# Patient Record
Sex: FEMALE | Employment: FULL TIME | URBAN - METROPOLITAN AREA
[De-identification: names, ages, dates, MRNs, and addresses within clinical notes are randomized per-mention and may not be internally consistent; named-entity substitution may affect disease eponyms.]

---

## 2018-02-07 ENCOUNTER — OFFICE VISIT (OUTPATIENT)
Dept: INTERNAL MEDICINE | Facility: CLINIC | Age: 33
End: 2018-02-07
Payer: MEDICAID

## 2018-02-07 VITALS
DIASTOLIC BLOOD PRESSURE: 60 MMHG | OXYGEN SATURATION: 98 % | BODY MASS INDEX: 27.09 KG/M2 | WEIGHT: 138 LBS | TEMPERATURE: 98.2 F | SYSTOLIC BLOOD PRESSURE: 94 MMHG | HEIGHT: 60 IN | HEART RATE: 78 BPM

## 2018-02-07 DIAGNOSIS — R19.6 BAD BREATH: ICD-10-CM

## 2018-02-07 DIAGNOSIS — R07.0 THROAT PAIN: Primary | ICD-10-CM

## 2018-02-07 DIAGNOSIS — K21.9 GASTROESOPHAGEAL REFLUX DISEASE WITHOUT ESOPHAGITIS: ICD-10-CM

## 2018-02-07 PROCEDURE — 99202 OFFICE O/P NEW SF 15 MIN: CPT | Performed by: NURSE PRACTITIONER

## 2018-02-07 NOTE — NURSING NOTE
Chief Complaint   Patient presents with     Throat Problem     pt states has issues with her throat and has had acid reflux. pt worried if this is something different feels weak in the am.       Initial BP 94/60 (BP Location: Left arm, Patient Position: Sitting, Cuff Size: Adult Regular)  Pulse 78  Temp 98.2  F (36.8  C) (Oral)  Ht 5' (1.524 m)  Wt 138 lb (62.6 kg)  SpO2 98%  BMI 26.95 kg/m2 Estimated body mass index is 26.95 kg/(m^2) as calculated from the following:    Height as of this encounter: 5' (1.524 m).    Weight as of this encounter: 138 lb (62.6 kg).  Medication Reconciliation: complete

## 2018-02-07 NOTE — PROGRESS NOTES
.  SUBJECTIVE:   Bertha Schwarz is a 32 year old female who presents to clinic today for the following health issues:    Chief Complaint   Patient presents with     Throat Problem     pt states has issues with her throat and has had acid reflux. pt worried if this is something different feels weak in the am.    It feel like it is a knob or food stuck  She can swallow and drink   After she is done eating feels like something is stuck in her throat   Feels like something mariajose bad  Has had thing  In her mouth that smelled bad   Tonsil are not removed   Never had strep     Last time she was told was acid reflux   Omeprazole   Needs refill as it did help when she took it   Has burning in her chest at times and it helps with this              Problem list and histories reviewed & adjusted, as indicated.  Additional history: as documented    Patient Active Problem List   Diagnosis     Active labor     Encounter for sterilization     Normal  (single liveborn)     Vaginal delivery     Past Surgical History:   Procedure Laterality Date     LAPAROSCOPIC TUBAL LIGATION  2012    Procedure:LAPAROSCOPIC TUBAL LIGATION; LAPAROSCOPIC TUBAL LIGATION; Surgeon:THEODORA SUAREZ; Location: OR       Social History   Substance Use Topics     Smoking status: Never Smoker     Smokeless tobacco: Never Used     Alcohol use No     History reviewed. No pertinent family history.        Reviewed and updated as needed this visit by clinical staff  Tobacco  Allergies  Meds  Med Hx  Surg Hx  Fam Hx  Soc Hx      Reviewed and updated as needed this visit by Provider  Allergies         ROS:  Constitutional, HEENT, cardiovascular, pulmonary, gi and gu systems are negative, except as otherwise noted.    OBJECTIVE:     BP 94/60 (BP Location: Left arm, Patient Position: Sitting, Cuff Size: Adult Regular)  Pulse 78  Temp 98.2  F (36.8  C) (Oral)  Ht 5' (1.524 m)  Wt 138 lb (62.6 kg)  SpO2 98%  BMI 26.95 kg/m2  Body mass  index is 26.95 kg/(m^2).  GENERAL: alert and no distress; here with    HENT: ear canals and TM's normal, nose and mouth without ulcers or lesions- no obvious abnormality of tonsil   RESP: lungs clear to auscultation - no rales, rhonchi or wheezes  CV: regular rate and rhythm  ABDOMEN: soft, nontender,  and bowel sounds normal  PSYCH: mentation appears normal, affect normal/bright    Diagnostic Test Results:  none     ASSESSMENT/PLAN:             1. Throat pain  Possibly related to tonsil stones by her descriptions   Will see ENT for further evaluation     2. Bad breath  As above   Will check sinus also   - OTOLARYNGOLOGY REFERRAL    3. Gastroesophageal reflux disease without esophagitis  Needs refill on medication   - omeprazole (PRILOSEC) 20 MG CR capsule; Take 1 capsule (20 mg) by mouth daily  Dispense: 90 capsule; Refill: 1    Patient Instructions   [facility]: [facility] [place] [phone]   [email or web address]  Call to make appointment     Omeprazole once daily for reflux       JACLYN Onofre [facility]

## 2018-02-07 NOTE — MR AVS SNAPSHOT
After Visit Summary   2/7/2018    Bertha Schwarz    MRN: [id number]           Patient Information     Date Of Birth          1985        Visit Information        Provider Department      2/7/2018 3:00 PM Ilene Campos APRN CNP; [employer] [facility]        Today's Diagnoses     Throat pain    -  1    Bad breath        Gastroesophageal reflux disease without esophagitis          Care Instructions    [facility] [phone]   [email or web address]  Call to make appointment     Omeprazole once daily for reflux           Follow-ups after your visit        Additional Services     OTOLARYNGOLOGY REFERRAL       Your provider has referred you to: [facility] [phone]   [email or web address]    Please be aware that coverage of these services is subject to the terms and limitations of your health insurance plan.  Call member services at your health plan with any benefit or coverage questions.      Please bring the following with you to your appointment:    (1) Any X-Rays, CTs or MRIs which have been performed.  Contact the facility where they were done to arrange for  prior to your scheduled appointment.   (2) List of current medications  (3) This referral request   (4) Any documents/labs given to you for this referral                  Who to contact     If you have questions or need follow up information about today's clinic visit or your schedule please contact [facility] directly at [phone].  Normal or non-critical lab and imaging results will be communicated to you by MyChart, letter or phone within 4 business days after the clinic has received the results. If you do not hear from us within 7 days, please contact the clinic through MyChart or phone. If you have a critical or abnormal lab result, we will notify you by phone as  "soon as possible.  Submit refill requests through [employer] or call your pharmacy and they will forward the refill request to us. Please allow 3 business days for your refill to be completed.          Additional Information About Your Visit        [employer] Information     [employer] lets you send messages to your doctor, view your test results, renew your prescriptions, schedule appointments and more. To sign up, go to [email or web address] . Click on \"Log in\" on the left side of the screen, which will take you to the Welcome page. Then click on \"Sign up Now\" on the right side of the page.     You will be asked to enter the access code listed below, as well as some personal information. Please follow the directions to create your username and password.     Your access code is: [email or web address]  Expires: 2018  3:44 PM     Your access code will  in 90 days. If you need help or a new code, please call your [facility] clinic or [phone].        Care EveryWhere ID     This is your Care EveryWhere ID. This could be used by other organizations to access your [facility] medical records  [id number]        Your Vitals Were     Pulse Temperature Height Pulse Oximetry BMI (Body Mass Index)       78 98.2  F (36.8  C) (Oral) 5' (1.524 m) 98% 26.95 kg/m2        Blood Pressure from Last 3 Encounters:   18 94/60   12 110/79   11 (!) 84/56    Weight from Last 3 Encounters:   18 138 lb (62.6 kg)   12 129 lb (58.5 kg)   11 129 lb 8 oz (58.7 kg)              We Performed the Following     OTOLARYNGOLOGY REFERRAL          Today's Medication Changes          These changes are accurate as of 18  3:44 PM.  If you have any questions, ask your nurse or doctor.               Start taking these medicines.        Dose/Directions    omeprazole 20 MG CR capsule   Commonly known as:  priLOSEC   Used for:  Gastroesophageal reflux disease without esophagitis   Started by:  Ilene Campos APRN CNP     "    Dose:  20 mg   Take 1 capsule (20 mg) by mouth daily   Quantity:  90 capsule   Refills:  1            Where to get your medicines      These medications were sent to [facility] [address], [place] - [address] [facility]  [address] [facility] [address]     Phone:  [phone]     omeprazole 20 MG CR capsule                Primary Care Provider Office Phone # Fax #    Michel Oconnor [phone]       [facility] [address] [facility] [address]        Equal Access to Services     [facility]: Hadii aad ku hadasho Soomaali, waaxda luqadaha, qaybta kaalmada adeegyada, mello alicea . So [facility] [phone].    ATENCIÓN: Si habla español, tiene a leonard disposición servicios gratuitos de asistencia lingüística. [facility] [phone].    We comply with applicable federal civil rights laws and [place] laws. We do not discriminate on the basis of race, color, national origin, age, disability, sex, sexual orientation, or gender identity.            Thank you!     Thank you for choosing [facility]  for your care. Our goal is always to provide you with excellent care. Hearing back from our patients is one way we can continue to improve our services. Please take a few minutes to complete the written survey that you may receive in the mail after your visit with us. Thank you!             Your Updated Medication List - Protect others around you: Learn how to safely use, store and throw away your medicines at www.disposemymeds.org.          This list is accurate as of 2/7/18  3:44 PM.  Always use your most recent med list.                   Brand Name Dispense Instructions for use Diagnosis    ibuprofen 600 MG tablet    ADVIL/MOTRIN    30 tablet    Take 1 tablet by mouth every 6 hours as needed for other (For mild pain and temperature greater than 102F).    Sterilization       omeprazole 20 MG CR capsule    priLOSEC     90 capsule    Take 1 capsule (20 mg) by mouth daily    Gastroesophageal reflux disease without esophagitis

## 2018-02-07 NOTE — LETTER
[facility]  [address]  [facility] [id number] [phone]          February 7, 2018    RE:  Bertha Schwarz                                                                                                                                                       [address]  [address]  [facility] [address]            To whom it may concern:    Bertha Schwarz was seen in clinic today.        Sincerely,        Ilene Campos RN, CNP

## 2018-02-07 NOTE — PATIENT INSTRUCTIONS
[facility]: [facility] [phone]   [email or web address]  Call to make appointment     Omeprazole once daily for reflux

## 2018-03-30 ENCOUNTER — HOSPITAL ENCOUNTER (EMERGENCY)
Facility: CLINIC | Age: 33
Discharge: HOME | End: 2018-03-30
Attending: EMERGENCY MEDICINE | Admitting: EMERGENCY MEDICINE
Payer: MEDICAID

## 2018-03-30 VITALS
RESPIRATION RATE: 18 BRPM | WEIGHT: 143 LBS | DIASTOLIC BLOOD PRESSURE: 77 MMHG | TEMPERATURE: 97.5 F | SYSTOLIC BLOOD PRESSURE: 115 MMHG | OXYGEN SATURATION: 99 % | HEART RATE: 71 BPM | BODY MASS INDEX: 27.93 KG/M2

## 2018-03-30 DIAGNOSIS — R42 LIGHTHEADEDNESS: ICD-10-CM

## 2018-03-30 DIAGNOSIS — R00.2 PALPITATIONS: ICD-10-CM

## 2018-03-30 DIAGNOSIS — R51.9 NONINTRACTABLE HEADACHE, UNSPECIFIED CHRONICITY PATTERN, UNSPECIFIED HEADACHE TYPE: ICD-10-CM

## 2018-03-30 LAB
ALBUMIN UR-MCNC: NEGATIVE MG/DL
ANION GAP SERPL CALCULATED.3IONS-SCNC: 6 MMOL/L (ref 3–14)
APPEARANCE UR: ABNORMAL
BACTERIA #/AREA URNS HPF: ABNORMAL /HPF
BASOPHILS # BLD AUTO: 0.1 10E9/L (ref 0–0.2)
BASOPHILS NFR BLD AUTO: 0.5 %
BILIRUB UR QL STRIP: NEGATIVE
BUN SERPL-MCNC: 13 MG/DL (ref 7–30)
CALCIUM SERPL-MCNC: 9.1 MG/DL (ref 8.5–10.1)
CHLORIDE SERPL-SCNC: 106 MMOL/L (ref 94–109)
CO2 SERPL-SCNC: 29 MMOL/L (ref 20–32)
COLOR UR AUTO: ABNORMAL
CREAT SERPL-MCNC: 0.62 MG/DL (ref 0.52–1.04)
DIFFERENTIAL METHOD BLD: NORMAL
EOSINOPHIL # BLD AUTO: 0.2 10E9/L (ref 0–0.7)
EOSINOPHIL NFR BLD AUTO: 1.7 %
ERYTHROCYTE [DISTWIDTH] IN BLOOD BY AUTOMATED COUNT: 12.6 % (ref 10–15)
GFR SERPL CREATININE-BSD FRML MDRD: >90 ML/MIN/1.7M2
GLUCOSE SERPL-MCNC: 111 MG/DL (ref 70–99)
GLUCOSE UR STRIP-MCNC: NEGATIVE MG/DL
HCT VFR BLD AUTO: 36.1 % (ref 35–47)
HGB BLD-MCNC: 12.3 G/DL (ref 11.7–15.7)
HGB UR QL STRIP: ABNORMAL
IMM GRANULOCYTES # BLD: 0 10E9/L (ref 0–0.4)
IMM GRANULOCYTES NFR BLD: 0.3 %
KETONES UR STRIP-MCNC: NEGATIVE MG/DL
LEUKOCYTE ESTERASE UR QL STRIP: ABNORMAL
LYMPHOCYTES # BLD AUTO: 3.8 10E9/L (ref 0.8–5.3)
LYMPHOCYTES NFR BLD AUTO: 34.7 %
MCH RBC QN AUTO: 30.3 PG (ref 26.5–33)
MCHC RBC AUTO-ENTMCNC: 34.1 G/DL (ref 31.5–36.5)
MCV RBC AUTO: 89 FL (ref 78–100)
MONOCYTES # BLD AUTO: 0.8 10E9/L (ref 0–1.3)
MONOCYTES NFR BLD AUTO: 7.3 %
NEUTROPHILS # BLD AUTO: 6.1 10E9/L (ref 1.6–8.3)
NEUTROPHILS NFR BLD AUTO: 55.5 %
NITRATE UR QL: NEGATIVE
NRBC # BLD AUTO: 0 10*3/UL
NRBC BLD AUTO-RTO: 0 /100
PH UR STRIP: 7 PH (ref 5–7)
PLATELET # BLD AUTO: 363 10E9/L (ref 150–450)
POTASSIUM SERPL-SCNC: 3.5 MMOL/L (ref 3.4–5.3)
RBC # BLD AUTO: 4.06 10E12/L (ref 3.8–5.2)
RBC #/AREA URNS AUTO: 1 /HPF (ref 0–2)
SODIUM SERPL-SCNC: 141 MMOL/L (ref 133–144)
SOURCE: ABNORMAL
SP GR UR STRIP: 1 (ref 1–1.03)
SQUAMOUS #/AREA URNS AUTO: 5 /HPF (ref 0–1)
TROPONIN I SERPL-MCNC: <0.015 UG/L (ref 0–0.04)
UROBILINOGEN UR STRIP-MCNC: 0 MG/DL (ref 0–2)
WBC # BLD AUTO: 10.9 10E9/L (ref 4–11)
WBC #/AREA URNS AUTO: 4 /HPF (ref 0–5)

## 2018-03-30 PROCEDURE — 84484 ASSAY OF TROPONIN QUANT: CPT | Performed by: EMERGENCY MEDICINE

## 2018-03-30 PROCEDURE — 96361 HYDRATE IV INFUSION ADD-ON: CPT

## 2018-03-30 PROCEDURE — 99284 EMERGENCY DEPT VISIT MOD MDM: CPT | Mod: 25

## 2018-03-30 PROCEDURE — 96360 HYDRATION IV INFUSION INIT: CPT

## 2018-03-30 PROCEDURE — 93005 ELECTROCARDIOGRAM TRACING: CPT

## 2018-03-30 PROCEDURE — 81001 URINALYSIS AUTO W/SCOPE: CPT | Performed by: EMERGENCY MEDICINE

## 2018-03-30 PROCEDURE — 80048 BASIC METABOLIC PNL TOTAL CA: CPT | Performed by: EMERGENCY MEDICINE

## 2018-03-30 PROCEDURE — 25000128 H RX IP 250 OP 636: Performed by: EMERGENCY MEDICINE

## 2018-03-30 PROCEDURE — 85025 COMPLETE CBC W/AUTO DIFF WBC: CPT | Performed by: EMERGENCY MEDICINE

## 2018-03-30 RX ADMIN — SODIUM CHLORIDE 1000 ML: 9 INJECTION, SOLUTION INTRAVENOUS at 19:16

## 2018-03-30 ASSESSMENT — ENCOUNTER SYMPTOMS
VOMITING: 0
FEVER: 0
LIGHT-HEADEDNESS: 1
DIZZINESS: 1
NAUSEA: 0
DIARRHEA: 0
DYSURIA: 1
HEADACHES: 1
SHORTNESS OF BREATH: 0

## 2018-03-30 NOTE — ED AVS SNAPSHOT
[facility] Emergency Department    [address]    [facility] [address]    Phone:  [phone]    Fax:  [phone]                                       Bertha Schwarz   MRN: [id number]    Department:  [facility] Emergency Department   Date of Visit:  3/30/2018           After Visit Summary Signature Page     I have received my discharge instructions, and my questions have been answered. I have discussed any challenges I see with this plan with the nurse or doctor.    ..........................................................................................................................................  Patient/Patient Representative Signature      ..........................................................................................................................................  Patient Representative Print Name and Relationship to Patient    ..................................................               ................................................  Date                                            Time    ..........................................................................................................................................  Reviewed by Signature/Title    ...................................................              ..............................................  Date                                                            Time

## 2018-03-30 NOTE — ED PROVIDER NOTES
"  History     Chief Complaint:  Headache    The history is provided by the patient. A  was used (Kuwaiti).      Bertha Schwarz is a 32 year old female with GERD and no other past medical problems who presents with a headache. The patient reports an onset of intermittent and moderate headache for the last 2 weeks, describing it as a band around her head. She states sometimes her headache has woken her up from sleep, which happened 2 nights ago. She also has associated intermittent lightheadedness with blurred vision, and fatigue. The patient's headache came on this morning when she was getting ready to go to work. When she was at work, she had palpitations, which she described as feeling like her heart was \"going fast\" associated with lightheadedness \"like I was going to faint,\" therefore prompting her visit to the emergency department. She has not taken anything for her headache because when she takes ibuprofen or Tylenol, her stomach gets irritated and feels nauseated. Here, she also states having mild, burning left sided chest pain, some dysuria and \"brown\" colored urine. No history of headaches or migraines. Denies fever, vaginal discharge, nausea, vomiting, diarrhea, and shortness of breath.     CARDIAC RISK FACTORS:  Sex:    Female  Tobacco:   No  Hypertension:   No  Hyperlipidemia:  No  Diabetes:   No  Family History:  No    PE/DVT RISK FACTORS:  Sex:    Female  Hormones:   No  Tobacco:   No  Cancer:   No  Travel:   No  Surgery:   No  Other immobilization: No  Personal history:  No  Family history:  No     Allergies:  No known drug allergies    Medications:    Ranitidine  Prilosec     Past Medical History:    The patient does not have any past pertinent medical history.     Past Surgical History:    Laparoscopic tubal ligation 2012    Family History:    History reviewed. No pertinent family history.      Social History:  Smoking status: Never smoker  Alcohol use: No   Marital Status:  " Single    Daughter at bedside.     Review of Systems   Constitutional: Negative for fever.   Eyes: Positive for visual disturbance.   Respiratory: Negative for shortness of breath.    Cardiovascular: Positive for chest pain.   Gastrointestinal: Negative for diarrhea, nausea and vomiting.   Genitourinary: Positive for dysuria. Negative for vaginal discharge.   Neurological: Positive for dizziness, light-headedness and headaches. Negative for syncope.   All other systems reviewed and are negative.    Physical Exam   Patient Vitals for the past 24 hrs:   BP Temp Temp src Pulse Resp SpO2 Weight   03/30/18 2100 115/77 - - - - 98 % -   03/30/18 2045 109/80 - - - - 99 % -   03/30/18 2030 112/86 - - - - 99 % -   03/30/18 2015 118/88 - - - - 100 % -   03/30/18 2000 109/78 - - - - 100 % -   03/30/18 1945 111/83 - - - - 100 % -   03/30/18 1930 111/79 - - - - 100 % -   03/30/18 1915 (!) 124/91 - - - - 100 % -   03/30/18 1746 121/83 97.5  F (36.4  C) Temporal 71 18 99 % 64.9 kg (143 lb)      Lying Orthostatic BP: 110/78 (Slightly dizzy) ; Lying Orthostatic Pulse: 70 bpm   Sitting Orthostatic BP: 118/88 (Slightly more dizzy) ; Sitting Orthostatic Pulse: 72 bpm   Standing Orthostatic BP: 113/86 (More dizzy, but dizziness goes away over time) ; Standing Orthostatic Pulse: 66 bpm    Physical Exam  General: Well-developed and well-nourished. Well appearing young  woman. Cooperative.  Head:  Atraumatic.  Eyes:  Conjunctivae, lids, and sclerae are normal. PERRL.  ENT:    Normal nose. Moist mucous membranes.  Neck:  Supple. Normal range of motion.  CV:  Regular rate and rhythm. Normal heart sounds with no murmurs, rubs, or gallops detected.  Resp:  No respiratory distress. Clear to auscultation bilaterally without decreased breath sounds, wheezing, rales, or rhonchi.  GI:  Soft. Non-distended. Non-tender.    MS:  Normal ROM. No bilateral lower extremity edema.  Skin:  Warm. Non-diaphoretic. No pallor.  Neuro:  Awake. A&Ox3.  Normal strength.  Psych: Normal mood and affect. Normal speech.  Vitals reviewed.    Emergency Department Course   EKG  Indication: palpitations, near syncope  Time: 19:00:55  Rate 64 bpm. NC interval 150. QRS duration 80. QT/QTc 396/408.   Normal sinus rhythm.  Agree with computer interpretation.   No acute ST changes.  No ECG for comparison.    Laboratory:  CBC: WNL (WBC 10.9, HGB 12.3, )    BMP: Glucose 111 (H) WNL (Creatinine 0.62)   Troponin (1905): <0.015  UA: Urine bloo Small, Leukocyte esterase Moderate, Bacteria Few, Squamous epithelial/HPF 5 (H)    Interventions:  1916: NS 1L IV Bolus     Emergency Department Course:  Past medical records, nursing notes, and vitals reviewed.  1843: I performed an exam of the patient and obtained history, as documented above.     1900: ECG obtained, findings as noted above.    1905: IV inserted and blood drawn for basic laboratory. Troponin obtained. Results as noted above.    2148: I rechecked the patient, she is feeling better. She was able to ambulate with no persistent lightheadedness or palpitations, and feels comfortable going home.     Patient discharged home with instructions regarding supportive care, medications, and reasons to return. The importance of close follow-up was reviewed.        PERC Rule for risk stratifying PE to low risk (calculator)  Background  Risk stratifies patients to low risk of PE if all 8 criteria are present including age <50, heart rate <100, O2 Sat >94%, no unilateral leg edema, no hemoptysis, no recent surgery or trauma, no prior VTE, and no hormone use.  Data  32 year old  6 years ago tubal ligation, no OCPs or more recent surgeries  PMH gastritis  Pulse: 71  SpO2: 99 %  Criteria   Of  8 possible items (all criteria must be present):  NEGATIVE  Age <50 years  Heart rate <100 bpm  Oxygen Saturation >94%  No unilateral leg swelling  No hemoptysis  No surgery or trauma within 4 weeks  No prior DVT or PE  No hormone use (oral,  "transdermal and intravaginal estrogens)  Interpretation  All eight criteria are met AND low clinical PE suspicion: No further evaluation for PE required    Impression & Plan    Medical Decision Making:  Bertha Schwarz is a 32-year-old female who presents with 2 weeks of intermittent headaches that are bandlike fashion as well as \"dizziness\" which she describes as feeling lightheaded with blurred vision.  Today she had worsening of this lightheadedness associated with palpitations and near syncope such that she presents for further evaluation.  She currently denies vision changes or palpitations.  She has a moderate headache.  Exam is unremarkable.    Laboratory studies including urinalysis and troponin are unremarkable.  Patient was given IV fluids and then had orthostatics which were negative.  She tolerated PO and ambulated in the emergency department.  She states she had minimal lightheadedness and feels comfortable with discharge.  She notes her headache to be improved without interventions other than IV fluids here.  I do not believe that imaging of her head is indicated at this time as her dizziness is more consistent with lightheadedness and headache improves with fluids alone.  CT scan will be deferred at this time in attempt to avoid radiation in a young woman.  I discussed with patient that if her headaches continue or worsen she may require MRI in the future and as such it is very important she follows up with her primary care provider.  I believe patient's \"dizziness\" is more consistent with lightheadedness and with her palpitations and near syncope today, I did order an outpatient Holter monitor to evaluate for arrhythmias.  EKG today did not show acute ST changes or arrhythmias however.  PE highly unlikely as patient PERC negative. ACS highly unlikely with no significant chest pain and no risk factors. Patient states she recently established care with [facility] and intends to follow-up " with them for her symptoms.  Recommend she use Tylenol and ibuprofen for her headache and also recommended she rest and drink plenty of fluids.  I provided strict return precautions and patient verbalized understanding.  I answered all her questions and patient is amenable to discharge.  Of note, all my interactions with patient were completed using a  for clarity.    Diagnosis:    ICD-10-CM   1. Lightheadedness R42   2. Palpitations R00.2   3. Nonintractable headache, unspecified chronicity pattern, unspecified headache type R51     Disposition:  Discharged    Mallorie Baker  3/30/2018   [facility] EMERGENCY DEPARTMENT  I, Mallorie Baker, am serving as a scribe at 6:43 PM on 3/30/2018 to document services personally performed by Cassia Bustillos MD based on my observations and the provider's statements to me.       Cassia Bustillos MD  03/31/18 9259

## 2018-03-30 NOTE — ED AVS SNAPSHOT
[facility] Emergency Department    [address]    [facility] [address]    Phone:  [phone]    Fax:  [phone]                                       Bertha Schwarz   MRN: [id number]    Department:  [facility] Emergency Department   Date of Visit:  3/30/2018           Patient Information     Date Of Birth          1985        Your diagnoses for this visit were:     Lightheadedness     Palpitations     Nonintractable headache, unspecified chronicity pattern, unspecified headache type        You were seen by Cassia Bustillos MD.      Follow-up Information     Follow up with [facility] In 3 days.    Specialties:  Sports Medicine, Pain Management, Obstetrics & Gynecology, Pediatrics, Internal Medicine, Nephrology    Why:  Or your clinic in [place]    Contact information:    [address]  [facility] [id number] [phone]        Follow up with [facility] Emergency Department.    Specialty:  EMERGENCY MEDICINE    Why:  If symptoms worsen    Contact information:    [address]  [facility] [address] [phone]        Discharge Instructions       Use Tylenol or ibuprofen for your headache.    It is very important to follow-up with your primary care provider next week. If your headaches persist, you may need further workup such as an MRI of your brain. For your palpitations and lightheadedness I have ordered a Holter monitor.  You will be called to arrange placement of this.    If you have new or concerning symptoms please return to the emergency department for further evaluation.    Discharge References/Attachments     HEADACHE, UNSPECIFIED ([place])    PALPITATIONS ([place])    NEAR SYNCOPE, UNKNOWN ([place])      24 Hour Appointment Hotline       To make an appointment at any [facility], call [phone] ([phone]). If you don't have a family doctor or clinic, we  will help you find one. [facility] are conveniently located to serve the needs of you and your family.          ED Discharge Orders     Holter Monitor 48 hour - Adult                    Review of your medicines      Our records show that you are taking the medicines listed below. If these are incorrect, please call your family doctor or clinic.        Dose / Directions Last dose taken    ibuprofen 600 MG tablet   Commonly known as:  ADVIL/MOTRIN   Dose:  600 mg   Quantity:  30 tablet        Take 1 tablet by mouth every 6 hours as needed for other (For mild pain and temperature greater than 102F).   Refills:  0        omeprazole 20 MG CR capsule   Commonly known as:  priLOSEC   Dose:  20 mg   Quantity:  90 capsule        Take 1 capsule (20 mg) by mouth daily   Refills:  1        RANITIDINE HCL PO   Dose:  150 mg        Take 150 mg by mouth 2 times daily   Refills:  0                Procedures and tests performed during your visit     Basic metabolic panel    CBC with platelets differential    Cardiac Continuous Monitoring    EKG 12-lead, tracing only    Peripheral IV catheter    Pulse oximetry nursing    Troponin I    UA with Microscopic      Orders Needing Specimen Collection     None      Pending Results     Date and Time Order Name Status Description    3/30/2018 1854 EKG 12-lead, tracing only Preliminary             Pending Culture Results     No orders found from 3/28/2018 to 3/31/2018.            Pending Results Instructions     If you had any lab results that were not finalized at the time of your Discharge, you can call the ED Lab Result RN at [phone]. You will be contacted by this team for any positive Lab results or changes in treatment. The nurses are available 7 days a week from 10A to 6:30P.  You can leave a message 24 hours per day and they will return your call.        Test Results From Your Hospital Stay        3/30/2018  7:36 PM      Component Results     Component Value Ref Range & Units  Status    Color Urine Straw  Final    Appearance Urine Slightly Cloudy  Final    Glucose Urine Negative NEG^Negative mg/dL Final    Bilirubin Urine Negative NEG^Negative Final    Ketones Urine Negative NEG^Negative mg/dL Final    Specific Gravity Urine 1.005 1.003 - 1.035 Final    Blood Urine Small (A) NEG^Negative Final    pH Urine 7.0 5.0 - 7.0 pH Final    Protein Albumin Urine Negative NEG^Negative mg/dL Final    Urobilinogen mg/dL 0.0 0.0 - 2.0 mg/dL Final    Nitrite Urine Negative NEG^Negative Final    Leukocyte Esterase Urine Moderate (A) NEG^Negative Final    Source Unspecified Urine  Final    WBC Urine 4 0 - 5 /HPF Final    RBC Urine 1 0 - 2 /HPF Final    Bacteria Urine Few (A) NEG^Negative /HPF Final    Squamous Epithelial /HPF Urine 5 (H) 0 - 1 /HPF Final         3/30/2018  7:52 PM      Component Results     Component Value Ref Range & Units Status    Troponin I ES <0.015 0.000 - 0.045 ug/L Final    The 99th percentile for upper reference range is 0.045 ug/L.  Troponin values   in the range of 0.045 - 0.120 ug/L may be associated with risks of adverse   clinical events.           3/30/2018  7:52 PM      Component Results     Component Value Ref Range & Units Status    Sodium 141 133 - 144 mmol/L Final    Potassium 3.5 3.4 - 5.3 mmol/L Final    Chloride 106 94 - 109 mmol/L Final    Carbon Dioxide 29 20 - 32 mmol/L Final    Anion Gap 6 3 - 14 mmol/L Final    Glucose 111 (H) 70 - 99 mg/dL Final    Urea Nitrogen 13 7 - 30 mg/dL Final    Creatinine 0.62 0.52 - 1.04 mg/dL Final    GFR Estimate >90 >60 mL/min/1.7m2 Final    Non  GFR Calc    GFR Estimate If Black >90 >60 mL/min/1.7m2 Final    African American GFR Calc    Calcium 9.1 8.5 - 10.1 mg/dL Final         3/30/2018  7:32 PM      Component Results     Component Value Ref Range & Units Status    WBC 10.9 4.0 - 11.0 10e9/L Final    RBC Count 4.06 3.8 - 5.2 10e12/L Final    Hemoglobin 12.3 11.7 - 15.7 g/dL Final    Hematocrit 36.1 35.0 - 47.0 %  Final    MCV 89 78 - 100 fl Final    MCH 30.3 26.5 - 33.0 pg Final    MCHC 34.1 31.5 - 36.5 g/dL Final    RDW 12.6 10.0 - 15.0 % Final    Platelet Count 363 150 - 450 10e9/L Final    Diff Method Automated Method  Final    % Neutrophils 55.5 % Final    % Lymphocytes 34.7 % Final    % Monocytes 7.3 % Final    % Eosinophils 1.7 % Final    % Basophils 0.5 % Final    % Immature Granulocytes 0.3 % Final    Nucleated RBCs 0 0 /100 Final    Absolute Neutrophil 6.1 1.6 - 8.3 10e9/L Final    Absolute Lymphocytes 3.8 0.8 - 5.3 10e9/L Final    Absolute Monocytes 0.8 0.0 - 1.3 10e9/L Final    Absolute Eosinophils 0.2 0.0 - 0.7 10e9/L Final    Absolute Basophils 0.1 0.0 - 0.2 10e9/L Final    Abs Immature Granulocytes 0.0 0 - 0.4 10e9/L Final    Absolute Nucleated RBC 0.0  Final                Clinical Quality Measure: Blood Pressure Screening     Your blood pressure was checked while you were in the emergency department today. The last reading we obtained was  BP: 115/77 . Please read the guidelines below about what these numbers mean and what you should do about them.  If your systolic blood pressure (the top number) is less than 120 and your diastolic blood pressure (the bottom number) is less than 80, then your blood pressure is normal. There is nothing more that you need to do about it.  If your systolic blood pressure (the top number) is 120-139 or your diastolic blood pressure (the bottom number) is 80-89, your blood pressure may be higher than it should be. You should have your blood pressure rechecked within a year by a primary care provider.  If your systolic blood pressure (the top number) is 140 or greater or your diastolic blood pressure (the bottom number) is 90 or greater, you may have high blood pressure. High blood pressure is treatable, but if left untreated over time it can put you at risk for heart attack, stroke, or kidney failure. You should have your blood pressure rechecked by a primary care provider within  "the next 4 weeks.  If your provider in the emergency department today gave you specific instructions to follow-up with your doctor or provider even sooner than that, you should follow that instruction and not wait for up to 4 weeks for your follow-up visit.        Thank you for choosing [facility]       Thank you for choosing [facility] for your care. Our goal is always to provide you with excellent care. Hearing back from our patients is one way we can continue to improve our services. Please take a few minutes to complete the written survey that you may receive in the mail after you visit with us. Thank you!        [facility] Information     [facility] lets you send messages to your doctor, view your test results, renew your prescriptions, schedule appointments and more. To sign up, go to [email or web address] . Click on \"Log in\" on the left side of the screen, which will take you to the Welcome page. Then click on \"Sign up Now\" on the right side of the page.     You will be asked to enter the access code listed below, as well as some personal information. Please follow the directions to create your username and password.     Your access code is: [email or web address]  Expires: 2018  4:44 PM     Your access code will  in 90 days. If you need help or a new code, please call your [facility] clinic or [phone].        Care EveryWhere ID     This is your Care EveryWhere ID. This could be used by other organizations to access your [facility] medical records  [id number]        Equal Access to Services     GERSON ORDAZ AH: Hadii chandrakant patel Sowestley, waaxda luqadaha, qaybta kaalmada margaret, mello alicea . So [facility] [phone].    ATENCIÓN: Si habla español, tiene a leonard disposición servicios gratuitos de asistencia lingüística. Douglas al [phone].    We comply with applicable federal civil rights laws and [place] laws. We do not discriminate on the basis of race, color, national origin, age, " disability, sex, sexual orientation, or gender identity.            After Visit Summary       This is your record. Keep this with you and show to your community pharmacist(s) and doctor(s) at your next visit.

## 2018-03-30 NOTE — ED NOTES
ABCs intact. Pt c/o intermittent headache x 2 weeks. C/o blurry vision and nausea with headaches as well.

## 2018-03-31 NOTE — DISCHARGE INSTRUCTIONS
Use Tylenol or ibuprofen for your headache.    It is very important to follow-up with your primary care provider next week. If your headaches persist, you may need further workup such as an MRI of your brain. For your palpitations and lightheadedness I have ordered a Holter monitor.  You will be called to arrange placement of this.    If you have new or concerning symptoms please return to the emergency department for further evaluation.

## 2018-03-31 NOTE — ED NOTES
Pt ambulate without assistance. Dizziness decreased as we walked. Pt had a steady gait and tolerated well.

## 2018-04-02 LAB — INTERPRETATION ECG - MUSE: NORMAL

## 2018-04-09 ENCOUNTER — HOSPITAL ENCOUNTER (OUTPATIENT)
Dept: CARDIOLOGY | Facility: CLINIC | Age: 33
Discharge: HOME | End: 2018-04-09
Attending: EMERGENCY MEDICINE | Admitting: EMERGENCY MEDICINE
Payer: MEDICAID

## 2018-04-09 DIAGNOSIS — R00.2 PALPITATIONS: ICD-10-CM

## 2018-04-09 DIAGNOSIS — R42 LIGHTHEADEDNESS: ICD-10-CM

## 2018-04-09 PROCEDURE — 93226 XTRNL ECG REC<48 HR SCAN A/R: CPT | Performed by: EMERGENCY MEDICINE

## 2018-04-09 PROCEDURE — T1013 SIGN LANG/ORAL INTERPRETER: HCPCS | Mod: U3

## 2018-04-09 PROCEDURE — 93227 XTRNL ECG REC<48 HR R&I: CPT | Performed by: INTERNAL MEDICINE

## 2018-04-16 LAB — INTERPRETATION MONITOR -MUSE: NORMAL

## 2018-05-24 ENCOUNTER — TELEPHONE (OUTPATIENT)
Dept: INTERNAL MEDICINE | Facility: CLINIC | Age: 33
End: 2018-05-24

## 2018-05-24 NOTE — LETTER
[facility]  [address], [address]  [place], [place]  [address]                                            TEL:[phone]  FAX:[phone]      Bertha Schwarz  [address]    [facility] [address]      June 14, 2018    Dear Bertha,        At [facility], we care about your health and well-being. A review of your chart has indicated that you are due for a pap smear. Please contact us at [phone] to schedule an appointment.     If you have already had one or all of the above screening tests at another facility, please call us to update your chart.        Sincerely,      RADHA Johnson

## 2018-05-24 NOTE — LETTER
[facility]  [address], [address]  [place], [place]  [address]                                            TEL:[phone]  FAX:[phone]      Bertha Schwarz  [address]    [facility] [address]      May 24, 2018    Dear Bertha,    At [facility], we care about your health and well-being. A review of your chart has indicated that you are due for a pap smear. Please contact us at [phone] to schedule an appointment.     If you have already had one or all of the above screening tests at another facility, please call us to update your chart.          Sincerely,      RADHA Johnson

## 2018-05-24 NOTE — TELEPHONE ENCOUNTER
Panel Management Review      Patient has the following on her problem list: None      Composite cancer screening  Chart review shows that this patient is due/due soon for the following Pap Smear  Summary:    Patient is due/failing the following:   PAP    Action needed:   Patient needs office visit for see above.    Type of outreach:    Sent letter.    Questions for provider review:    None                                                                                                                                    .LIZZ SCHWARTZ LPN       Chart routed to none .

## 2019-03-15 ENCOUNTER — HOSPITAL ENCOUNTER (EMERGENCY)
Facility: CLINIC | Age: 34
Discharge: HOME | End: 2019-03-15
Attending: EMERGENCY MEDICINE | Admitting: EMERGENCY MEDICINE
Payer: MEDICAID

## 2019-03-15 VITALS
SYSTOLIC BLOOD PRESSURE: 107 MMHG | DIASTOLIC BLOOD PRESSURE: 79 MMHG | TEMPERATURE: 98.3 F | RESPIRATION RATE: 16 BRPM | OXYGEN SATURATION: 99 %

## 2019-03-15 DIAGNOSIS — R11.2 NON-INTRACTABLE VOMITING WITH NAUSEA, UNSPECIFIED VOMITING TYPE: ICD-10-CM

## 2019-03-15 DIAGNOSIS — R51.9 NONINTRACTABLE HEADACHE, UNSPECIFIED CHRONICITY PATTERN, UNSPECIFIED HEADACHE TYPE: ICD-10-CM

## 2019-03-15 DIAGNOSIS — R42 DIZZINESS: ICD-10-CM

## 2019-03-15 DIAGNOSIS — R10.13 EPIGASTRIC PAIN: ICD-10-CM

## 2019-03-15 LAB
ANION GAP SERPL CALCULATED.3IONS-SCNC: 6 MMOL/L (ref 3–14)
B-HCG FREE SERPL-ACNC: <5 IU/L
BASOPHILS # BLD AUTO: 0 10E9/L (ref 0–0.2)
BASOPHILS NFR BLD AUTO: 0.5 %
BUN SERPL-MCNC: 11 MG/DL (ref 7–30)
CALCIUM SERPL-MCNC: 8.7 MG/DL (ref 8.5–10.1)
CHLORIDE SERPL-SCNC: 107 MMOL/L (ref 94–109)
CO2 SERPL-SCNC: 26 MMOL/L (ref 20–32)
CREAT SERPL-MCNC: 0.62 MG/DL (ref 0.52–1.04)
DIFFERENTIAL METHOD BLD: NORMAL
EOSINOPHIL # BLD AUTO: 0.1 10E9/L (ref 0–0.7)
EOSINOPHIL NFR BLD AUTO: 0.6 %
ERYTHROCYTE [DISTWIDTH] IN BLOOD BY AUTOMATED COUNT: 13 % (ref 10–15)
GFR SERPL CREATININE-BSD FRML MDRD: >90 ML/MIN/{1.73_M2}
GLUCOSE SERPL-MCNC: 81 MG/DL (ref 70–99)
HCT VFR BLD AUTO: 38 % (ref 35–47)
HGB BLD-MCNC: 12.5 G/DL (ref 11.7–15.7)
IMM GRANULOCYTES # BLD: 0 10E9/L (ref 0–0.4)
IMM GRANULOCYTES NFR BLD: 0.2 %
INTERPRETATION ECG - MUSE: NORMAL
LYMPHOCYTES # BLD AUTO: 3.3 10E9/L (ref 0.8–5.3)
LYMPHOCYTES NFR BLD AUTO: 37.8 %
MCH RBC QN AUTO: 29.8 PG (ref 26.5–33)
MCHC RBC AUTO-ENTMCNC: 32.9 G/DL (ref 31.5–36.5)
MCV RBC AUTO: 91 FL (ref 78–100)
MONOCYTES # BLD AUTO: 0.4 10E9/L (ref 0–1.3)
MONOCYTES NFR BLD AUTO: 4.9 %
NEUTROPHILS # BLD AUTO: 4.9 10E9/L (ref 1.6–8.3)
NEUTROPHILS NFR BLD AUTO: 56 %
NRBC # BLD AUTO: 0 10*3/UL
NRBC BLD AUTO-RTO: 0 /100
PLATELET # BLD AUTO: 402 10E9/L (ref 150–450)
POTASSIUM SERPL-SCNC: 3.4 MMOL/L (ref 3.4–5.3)
RBC # BLD AUTO: 4.2 10E12/L (ref 3.8–5.2)
SODIUM SERPL-SCNC: 139 MMOL/L (ref 133–144)
WBC # BLD AUTO: 8.7 10E9/L (ref 4–11)

## 2019-03-15 PROCEDURE — 99284 EMERGENCY DEPT VISIT MOD MDM: CPT | Mod: 25

## 2019-03-15 PROCEDURE — 93005 ELECTROCARDIOGRAM TRACING: CPT

## 2019-03-15 PROCEDURE — 25000128 H RX IP 250 OP 636: Performed by: EMERGENCY MEDICINE

## 2019-03-15 PROCEDURE — 85025 COMPLETE CBC W/AUTO DIFF WBC: CPT | Performed by: EMERGENCY MEDICINE

## 2019-03-15 PROCEDURE — 80048 BASIC METABOLIC PNL TOTAL CA: CPT | Performed by: EMERGENCY MEDICINE

## 2019-03-15 PROCEDURE — 84702 CHORIONIC GONADOTROPIN TEST: CPT

## 2019-03-15 PROCEDURE — 96360 HYDRATION IV INFUSION INIT: CPT

## 2019-03-15 RX ORDER — METOCLOPRAMIDE 10 MG/1
10 TABLET ORAL 3 TIMES DAILY PRN
Qty: 20 TABLET | Refills: 0 | Status: SHIPPED | OUTPATIENT
Start: 2019-03-15

## 2019-03-15 RX ORDER — LIDOCAINE 40 MG/G
CREAM TOPICAL
Status: DISCONTINUED | OUTPATIENT
Start: 2019-03-15 | End: 2019-03-15 | Stop reason: HOSPADM

## 2019-03-15 RX ADMIN — SODIUM CHLORIDE 1000 ML: 9 INJECTION, SOLUTION INTRAVENOUS at 13:00

## 2019-03-15 ASSESSMENT — ENCOUNTER SYMPTOMS
VOMITING: 1
ROS GI COMMENTS: DARK STOOL
HEADACHES: 1
NAUSEA: 1
DIZZINESS: 1
ABDOMINAL PAIN: 1

## 2019-03-15 NOTE — ED TRIAGE NOTES
Pt has dizziness with headache for past 2 weeks.  She has nausea and vomiting for past 2 days.  No injury to head.

## 2019-03-15 NOTE — ED PROVIDER NOTES
"  History     Chief Complaint:    Abdominal Pain, Dizziness, and Headache    History limited due to language barrier. History provided by phone .     JAVI   Bertha Schwarz is a 33 year old female with a history of gastritis who presents with abdominal pain, dizziness, and headache. The patient reports that she has been having increasing amounts of abdominal pain since yesterday that worsens when she eats with associated nausea and vomiting. She notes that the pain radiates into her sides. The patient has been having dark colored stool recently, per her report, and details them to be \"sort of black\". She notes that she has a headache that is similar to her history of headaches which also causes nausea and she feels better when she's laying down. The patient continues to detail that she has had dizziness for a duration now and it is present when she stands and occasionally when she lays down. She has been evaluated by her primary care physician out of concern for anemia, however her hemoglobin was normal. The patient denies changes in urination, vaginal bleeding or issues with menstrual cycle, however notes that she had a tubal ligation therefore can not get pregnant. Lastly, she notes that she has a history of gastritis, although never had a scope performed. She was prescribed Prilosec for the past 4 years and instructed to \"take it for life\".     Allergies:  No known drug allergies.       Medications:    Prilosec  Ranitidine     Past Medical History:    Gastriti     Past Surgical History:    Tubal ligation     Family History:    Family history reviewed. No pertinent family history.     Social History:  The patient was accompanied to the ED by friend.  Smoking Status: Never Smoker  Smokeless Tobacco: Never Used  Alcohol Use: Negative  Drug Use: Negative  Marital Status:  Single      Review of Systems   Gastrointestinal: Positive for abdominal pain, nausea and vomiting.        Dark stool   Genitourinary: " Negative for menstrual problem and vaginal bleeding.        No urination changes   Neurological: Positive for dizziness and headaches.   All other systems reviewed and are negative.    Physical Exam     Patient Vitals for the past 24 hrs:   BP Temp Temp src Heart Rate Resp SpO2   03/15/19 1045 107/79 98.3  F (36.8  C) Oral 77 16 99 %     Physical Exam  General: Adult female sitting upright  Eyes: PERRL, Conjunctive within normal limits. No scleral icterus.  ENT: Moist mucous membranes, oropharynx clear.   CV: Normal S1S2, no murmur, rub or gallop. Regular rate and rhythm  Resp: Clear to auscultation bilaterally, no wheezes, rales or rhonchi. Normal respiratory effort.  GI: Abdomen is soft and nondistended. Epigastric tenderness to palpation. No palpable masses. No rebound or guarding.  MSK: No edema. Nontender. Normal active range of motion.  Skin: Warm and dry. No rashes or lesions or ecchymoses on visible skin.  Neuro: Alert and oriented. Responds appropriately to all questions and commands. No focal findings appreciated. Normal muscle tone.  Psych: Normal mood and affect. Pleasant.    Emergency Department Course     Laboratory:  Laboratory findings were communicated with the patient who voiced understanding of the findings.    ISTAT HCG Quantitative Pregnancy: <5.0  CBC: WBC 8.7, HGB 12.5,   BMP: WNL (Creatinine 0.62)    Interventions:  1300 NS 1000 mL IV    Emergency Department Course:     Nursing notes and vitals reviewed.    1027 EKG obtained as noted above.     1231 I performed an exam of the patient as documented above.     1241 IV was inserted and blood was drawn for laboratory testing, results above.     1247 Blood drawn. This was sent to the lab for further testing, results above.     1328 Patient rechecked and updated.  She if feeling comfortable. Denies any new concerns.      I personally reviewed the lab results with the patient and answered all related questions prior to  discharge.    Impression & Plan      Medical Decision Making:  Bertha Schwarz is a 33 year old female who presents to the emergency department today for evaluation of abdominal pain. At this point, the exact etiology is unknown, however I feel we have adequately assessed for acute and dangerous pathology. There is no right upper quadrant tenderness, jaundice, or fevers to increase my concern for cholelithiasis or gallbladder infection. Given this I feel that RUQ U/S is not needed emergently; if sxs continue I have recommended it on an outpt basis. I do not suspect bowel obstruction as the pt is not having significant vomiting, there no rebound/guarding/peritoneal signs. The pt does not have risk factors to make a AAA likely. Otherwise, although obviously uncomfortable,  is well appearing. I suspect this pain may be secondary to gastritis versus peptic ulcer disease. The patient does/does not have a high caffeine intake, high NSAID use, excessive alcohol intake. We discussed limiting these to help prevent progression of the pain.  We discussed options for treating this and ultimately have decided to continue Prilosec with addition to Reglan for nauesa.     She is also noting dizzziness which is somewhat chronic for her but frustrated that she can not find a cause as well as a mild headache which is not new for her. None of these symptoms seem to likely be to be life threatening and unclear if related to the epigastric pain, which is reproducible on examination and unlikely referred from the chest. She had normal findings here with no evidence to suggest acute GI bleed, reccommended to follow up with her PCP early next week and a endoscopy is ordered for her. I discussed the plan with her and she is understanding and in agreement. All questions were answered prior to discharge.       Diagnosis:    ICD-10-CM    1. Epigastric pain R10.13 GASTROENTEROLOGY ADULT REF PROCEDURE ONLY   2. Non-intractable vomiting with  nausea, unspecified vomiting type R11.2 GASTROENTEROLOGY ADULT REF PROCEDURE ONLY   3. Nonintractable headache, unspecified chronicity pattern, unspecified headache type R51    4. Dizziness R42         Disposition:   The patient is discharged to home.     Discharge Medications:       START taking      Dose / Directions   metoclopramide 10 MG tablet  Commonly known as:  REGLAN      Dose:  10 mg  Take 1 tablet (10 mg) by mouth 3 times daily as needed (nausea/vomiting)  Quantity:  20 tablet  Refills:  0           Where to get your medicines      Some of these will need a paper prescription and others can be bought over the counter. Ask your nurse if you have questions.    Bring a paper prescription for each of these medications    metoclopramide 10 MG tablet         Scribe Disclosure:  I, Orla Severson, am serving as a scribe at 12:35 PM on 3/15/2019 to document services personally performed by Lorin Pradhan MD based on my observations and the provider's statements to me.     [facility] EMERGENCY DEPARTMENT       Lorin Pradhan MD  03/16/19 0594

## 2019-03-15 NOTE — ED AVS SNAPSHOT
[facility] Emergency Department  [address]  [facility] [address]  Phone:  [phone]  Fax:  [phone]                                    Bertha Schwarz   MRN: [id number]    Department:  [facility] Emergency Department   Date of Visit:  3/15/2019           After Visit Summary Signature Page    I have received my discharge instructions, and my questions have been answered. I have discussed any challenges I see with this plan with the nurse or doctor.    ..........................................................................................................................................  Patient/Patient Representative Signature      ..........................................................................................................................................  Patient Representative Print Name and Relationship to Patient    ..................................................               ................................................  Date                                   Time    ..........................................................................................................................................  Reviewed by Signature/Title    ...................................................              ..............................................  Date                                               Time          22EPIC Rev 08/18

## 2019-03-15 NOTE — ED TRIAGE NOTES
Patient speaks [place] and would like her cousin, , to interpret for her. She reports chills, cough, body aches, chest and back pain, headache and congestion for 3 days.

## 2019-03-15 NOTE — LETTER
03/15/19      To Whom it may concern:    _________________________ was in our Emergency Department today, 03/15/19. with a patient who needed their assistance.  Please excuse them from work/school.      Sincerely,

## 2019-11-16 ENCOUNTER — HOSPITAL ENCOUNTER (EMERGENCY)
Facility: CLINIC | Age: 34
Discharge: HOME | End: 2019-11-16
Attending: EMERGENCY MEDICINE | Admitting: EMERGENCY MEDICINE
Payer: MEDICAID

## 2019-11-16 ENCOUNTER — TRANSFERRED RECORDS (OUTPATIENT)
Dept: HEALTH INFORMATION MANAGEMENT | Facility: CLINIC | Age: 34
End: 2019-11-16

## 2019-11-16 ENCOUNTER — APPOINTMENT (OUTPATIENT)
Dept: ULTRASOUND IMAGING | Facility: CLINIC | Age: 34
End: 2019-11-16
Attending: EMERGENCY MEDICINE
Payer: MEDICAID

## 2019-11-16 VITALS
WEIGHT: 137 LBS | OXYGEN SATURATION: 99 % | DIASTOLIC BLOOD PRESSURE: 68 MMHG | HEIGHT: 60 IN | SYSTOLIC BLOOD PRESSURE: 112 MMHG | BODY MASS INDEX: 26.9 KG/M2 | RESPIRATION RATE: 18 BRPM | TEMPERATURE: 96.7 F | HEART RATE: 90 BPM

## 2019-11-16 DIAGNOSIS — O00.109 TUBAL PREGNANCY WITHOUT INTRAUTERINE PREGNANCY, UNSPECIFIED LATERALITY: ICD-10-CM

## 2019-11-16 DIAGNOSIS — R10.2 PELVIC PAIN IN FEMALE: ICD-10-CM

## 2019-11-16 LAB
ABO + RH BLD: NORMAL
ABO + RH BLD: NORMAL
ALBUMIN SERPL-MCNC: 3.7 G/DL (ref 3.4–5)
ALBUMIN UR-MCNC: NEGATIVE MG/DL
ALP SERPL-CCNC: 74 U/L (ref 40–150)
ALT SERPL W P-5'-P-CCNC: 48 U/L (ref 0–50)
ANION GAP SERPL CALCULATED.3IONS-SCNC: 5 MMOL/L (ref 3–14)
APPEARANCE UR: CLEAR
AST SERPL W P-5'-P-CCNC: 25 U/L (ref 0–45)
B-HCG FREE SERPL-ACNC: 65.3 IU/L
B-HCG SERPL-ACNC: 58 IU/L (ref 0–5)
BACTERIA #/AREA URNS HPF: ABNORMAL /HPF
BILIRUB DIRECT SERPL-MCNC: 0.2 MG/DL (ref 0–0.2)
BILIRUB SERPL-MCNC: 0.7 MG/DL (ref 0.2–1.3)
BILIRUB UR QL STRIP: NEGATIVE
BLD GP AB SCN SERPL QL: NORMAL
BLOOD BANK CMNT PATIENT-IMP: NORMAL
BUN SERPL-MCNC: 10 MG/DL (ref 7–30)
CALCIUM SERPL-MCNC: 8.6 MG/DL (ref 8.5–10.1)
CHLORIDE SERPL-SCNC: 109 MMOL/L (ref 94–109)
CO2 SERPL-SCNC: 25 MMOL/L (ref 20–32)
COLOR UR AUTO: ABNORMAL
CREAT SERPL-MCNC: 0.61 MG/DL (ref 0.52–1.04)
GFR SERPL CREATININE-BSD FRML MDRD: >90 ML/MIN/{1.73_M2}
GLUCOSE SERPL-MCNC: 128 MG/DL (ref 70–99)
GLUCOSE UR STRIP-MCNC: NEGATIVE MG/DL
HGB UR QL STRIP: NEGATIVE
KETONES UR STRIP-MCNC: NEGATIVE MG/DL
LEUKOCYTE ESTERASE UR QL STRIP: ABNORMAL
MUCOUS THREADS #/AREA URNS LPF: PRESENT /LPF
NITRATE UR QL: NEGATIVE
PH UR STRIP: 6.5 PH (ref 5–7)
POTASSIUM SERPL-SCNC: 3.2 MMOL/L (ref 3.4–5.3)
PROT SERPL-MCNC: 7.6 G/DL (ref 6.8–8.8)
RBC #/AREA URNS AUTO: <1 /HPF (ref 0–2)
SODIUM SERPL-SCNC: 139 MMOL/L (ref 133–144)
SOURCE: ABNORMAL
SP GR UR STRIP: 1 (ref 1–1.03)
SPECIMEN EXP DATE BLD: NORMAL
SQUAMOUS #/AREA URNS AUTO: 1 /HPF (ref 0–1)
UROBILINOGEN UR STRIP-MCNC: NORMAL MG/DL (ref 0–2)
WBC #/AREA URNS AUTO: 1 /HPF (ref 0–5)

## 2019-11-16 PROCEDURE — 81001 URINALYSIS AUTO W/SCOPE: CPT | Performed by: EMERGENCY MEDICINE

## 2019-11-16 PROCEDURE — 25000132 ZZH RX MED GY IP 250 OP 250 PS 637: Performed by: EMERGENCY MEDICINE

## 2019-11-16 PROCEDURE — 84702 CHORIONIC GONADOTROPIN TEST: CPT | Mod: 91

## 2019-11-16 PROCEDURE — 25000128 H RX IP 250 OP 636: Performed by: EMERGENCY MEDICINE

## 2019-11-16 PROCEDURE — 76801 OB US < 14 WKS SINGLE FETUS: CPT

## 2019-11-16 PROCEDURE — 86850 RBC ANTIBODY SCREEN: CPT | Performed by: EMERGENCY MEDICINE

## 2019-11-16 PROCEDURE — 86901 BLOOD TYPING SEROLOGIC RH(D): CPT | Performed by: EMERGENCY MEDICINE

## 2019-11-16 PROCEDURE — 96375 TX/PRO/DX INJ NEW DRUG ADDON: CPT

## 2019-11-16 PROCEDURE — 80076 HEPATIC FUNCTION PANEL: CPT | Performed by: EMERGENCY MEDICINE

## 2019-11-16 PROCEDURE — 96374 THER/PROPH/DIAG INJ IV PUSH: CPT

## 2019-11-16 PROCEDURE — 84702 CHORIONIC GONADOTROPIN TEST: CPT | Performed by: EMERGENCY MEDICINE

## 2019-11-16 PROCEDURE — 96401 CHEMO ANTI-NEOPL SQ/IM: CPT

## 2019-11-16 PROCEDURE — 86900 BLOOD TYPING SEROLOGIC ABO: CPT | Performed by: EMERGENCY MEDICINE

## 2019-11-16 PROCEDURE — 99285 EMERGENCY DEPT VISIT HI MDM: CPT | Mod: 25

## 2019-11-16 PROCEDURE — 80048 BASIC METABOLIC PNL TOTAL CA: CPT | Performed by: EMERGENCY MEDICINE

## 2019-11-16 RX ORDER — METOCLOPRAMIDE HYDROCHLORIDE 5 MG/ML
5 INJECTION INTRAMUSCULAR; INTRAVENOUS ONCE
Status: COMPLETED | OUTPATIENT
Start: 2019-11-16 | End: 2019-11-16

## 2019-11-16 RX ORDER — ACETAMINOPHEN 500 MG
1000 TABLET ORAL ONCE
Status: COMPLETED | OUTPATIENT
Start: 2019-11-16 | End: 2019-11-16

## 2019-11-16 RX ORDER — DIPHENHYDRAMINE HYDROCHLORIDE 50 MG/ML
25 INJECTION INTRAMUSCULAR; INTRAVENOUS ONCE
Status: COMPLETED | OUTPATIENT
Start: 2019-11-16 | End: 2019-11-16

## 2019-11-16 RX ORDER — METHOTREXATE 25 MG/ML
50 INJECTION INTRA-ARTERIAL; INTRAMUSCULAR; INTRATHECAL; INTRAVENOUS ONCE
Status: COMPLETED | OUTPATIENT
Start: 2019-11-16 | End: 2019-11-16

## 2019-11-16 RX ADMIN — METHOTREXATE 81 MG: 25 INJECTION, SOLUTION INTRA-ARTERIAL; INTRAMUSCULAR; INTRATHECAL; INTRAVENOUS at 21:29

## 2019-11-16 RX ADMIN — DIPHENHYDRAMINE HYDROCHLORIDE 25 MG: 50 INJECTION, SOLUTION INTRAMUSCULAR; INTRAVENOUS at 14:45

## 2019-11-16 RX ADMIN — METOCLOPRAMIDE 5 MG: 5 INJECTION, SOLUTION INTRAMUSCULAR; INTRAVENOUS at 14:44

## 2019-11-16 RX ADMIN — ACETAMINOPHEN 1000 MG: 500 TABLET, FILM COATED ORAL at 14:44

## 2019-11-16 ASSESSMENT — ENCOUNTER SYMPTOMS
DYSURIA: 1
ABDOMINAL PAIN: 1
FEVER: 0
DIARRHEA: 0

## 2019-11-16 ASSESSMENT — MIFFLIN-ST. JEOR: SCORE: 1247.93

## 2019-11-16 NOTE — ED PROVIDER NOTES
History     Chief Complaint:  Abdominal Pain    The history is provided by the patient. The history is limited by a language barrier. A  was used.      Bertha Schwarz is a 34 year old female who presents with abdominal pain. The patient reports that in 2014 she had a tubal ligation through [facility]. In October, the patient did not have a menstrual cycle but on November 2 she had a menstrual cycle that was worse than she has had in the past with clotting. At that same time, she started to develop intermittent lower left abdominal pain that has not subsided and now she is experiencing mild dysuria. Today, the patient was seen in clinic where she had blood draw and a urinalysis, as noted below, and this showed a positive pregnancy test. Here, the patient is not experiencing any vaginal bleeding and she denies diarrhea or fever. She has not had any other abdominal surgeries.     CBC: WNL  HCG: Positive     Allergies:  No known drug allergies.       Medications:    Reglan   Prilosec   Ranitidine     Past Medical History:    Medical history reviewed. No pertinent medical history.      Past Surgical History:    Tubal ligation     Family History:    Family history reviewed. No pertinent family history.     Social History:  The patient was accompanied to the ED by daughter.  Smoking Status: Never Smoker  Smokeless Tobacco: Never Used  Alcohol Use: Negative   Drug Use: Negative  PCP: [facility]   Marital Status:  Single      Review of Systems   Constitutional: Negative for fever.   Gastrointestinal: Positive for abdominal pain. Negative for diarrhea.   Genitourinary: Positive for dysuria and menstrual problem. Negative for vaginal bleeding.   All other systems reviewed and are negative.    Physical Exam     Patient Vitals for the past 24 hrs:   BP Temp Temp src Pulse Heart Rate Resp SpO2   11/16/19 1437 -- -- -- -- -- -- 100 %   11/16/19 1436 -- -- -- -- -- -- 100 %    11/16/19 1435 -- -- -- 69 -- -- 100 %   11/16/19 1427 -- -- -- -- -- -- 98 %   11/16/19 1426 126/86 -- -- 101 -- -- --   11/16/19 1354 115/83 96.7  F (35.9  C) Temporal 85 85 16 100 %      Physical Exam  Gen: well appearing, in no acute distress  HEENT:  mmm,   Lungs:  CTAB,  no resp distress  CV: rrr, no m/r/g, ppi  Abd: soft, moderate suprapubic and left lower quadrant tenderness palpation, nondistended, no rebound/masses/guarding/hsm  Ext: no peripheral edema  Skin: warm, dry, well perfused, no rashes/bruising/lesions on exposed skin  Neuro: alert, MAEE, no gross motor or sensory deficits, gait stable  Psych: Normal mood, normal affect      Emergency Department Course     Imaging:  Radiology findings were communicated with the patient who voiced understanding of the findings.    OB  US 1st trimester w transvag  No ultrasound evidence of intrauterine or ectopic  pregnancy. Trace amount of free pelvic fluid and probable right  hydrosalpinx. Ovaries are unremarkable.  Reading per radiology    Laboratory:  Laboratory findings were communicated with the patient who voiced understanding of the findings.    UA: Leukocyte Esterase trace (A), Bacteria few (A), Mucous present (A), o/w WNL.    BMP: Potassium 3.2 (L), Glucose 128 (H), o/w WNL (Creatinine 0.61)    HCG Quantitative: 58 (H)  ISTAT HCG Quantitative: 65.3 (H)     ABO/Rh type and screen: O Positive, antibody negative     Interventions:  Medications   metoclopramide (REGLAN) injection 5 mg (5 mg Intravenous Given 11/16/19 1444)   diphenhydrAMINE (BENADRYL) injection 25 mg (25 mg Intravenous Given 11/16/19 1445)   acetaminophen (TYLENOL) tablet 1,000 mg (1,000 mg Oral Given 11/16/19 1444)      Emergency Department Course:    1414 Nursing notes and vitals reviewed. I performed an exam of the patient as documented above.     1418 IV was inserted and blood was drawn for laboratory testing, results above.     1524 The patient was sent for a US while in the emergency  department, results above.      1555 The patient provided a urine sample here in the emergency department. This was sent for laboratory testing, findings above.     1623 Patient rechecked and updated.      1632 I spoke with Dr. Oliva of the OB/GYN service regarding patient's presentation, findings, and plan of care.     1700 Patient rechecked and updated.                      Impression & Plan      Medical Decision Making:  Bertha Schwarz is a 34 year old female who presents to the emergency department today for evaluation of left lower quadrant pain positive urinary pregnancy test at urgent care in the setting of tubal ligation 2014.    Concern here was for ectopic pregnancy given her history of tubal ligation and now positive pregnancy test.  She does have some abdominal tenderness but with the work-up here in response to therapies there is little concern at this point for rupture.  Spoke with OB who came to the emergency room discussed options of methotrexate versus laparoscopic procedure.  Patient preferred to methotrexate at this point which is certainly reasonable as consented by OB/GYN here in the emergency department.  Her hemodynamics remain stable her pain improved with the above interventions here in the emergency department.  Remainder of her work-up was unremarkable.  Will administer the methotrexate watch her after the injection for 1 hour make sure there is no immediate complications.  After which she will be discharged home and arranged to follow-up with OB as they directed.  Care done through an  questions answered patient comfortable and agreeable to.    Update: methotrexate ordered and received from pharmacy.  No floor, chemotherapy RN available to administer.  ED charge and ANS working on solution.  Pt will be f/u by Dr. Miranda.  If unable to admin in ED tonight will either obsv admit at [facility] if day shift RN on 5th floor is able to admin or arrange outpt infusion at [facility]  tomorrow (Sunday).            Diagnosis:      ICD-10-CM    1. Pelvic pain in female R10.2 Hepatic panel   2. Tubal pregnancy without intrauterine pregnancy, unspecified laterality O00.109        Disposition:   Home    Discharge Medications:    New Prescriptions    No medications on file     Scribe Disclosure:  I, Orla Severson, am serving as a scribe at 2:15 PM on 11/16/2019 to document services personally performed by Kehinde Menchaca MD based on my observations and the provider's statements to me.   [facility] EMERGENCY DEPARTMENT       Kehinde Menchaca MD  11/16/19 1829       Kehinde Menchaca MD  11/16/19 2028

## 2019-11-16 NOTE — ED AVS SNAPSHOT
[facility] Emergency Department  [address]  [facility] [address]  Phone:  [phone]  Fax:  [phone]                                    Bertha Schwarz   MRN: [id number]    Department:  [facility] Emergency Department   Date of Visit:  11/16/2019           After Visit Summary Signature Page    I have received my discharge instructions, and my questions have been answered. I have discussed any challenges I see with this plan with the nurse or doctor.    ..........................................................................................................................................  Patient/Patient Representative Signature      ..........................................................................................................................................  Patient Representative Print Name and Relationship to Patient    ..................................................               ................................................  Date                                   Time    ..........................................................................................................................................  Reviewed by Signature/Title    ...................................................              ..............................................  Date                                               Time          22EPIC Rev 08/18

## 2019-11-16 NOTE — ED TRIAGE NOTES
Pt with LLQ pain for over the past week. Went to clinic today and had positive pregnancy test. States hx of tubal ligation 2014. Denies bleeding currently, denies vomiting or diarrhea. ABC's intact, alert and oriented X3. States last period was 11/2 and last longer than normal.

## 2019-11-16 NOTE — CONSULTS
History and Physical     Bertha Schwarz MRN# [id number]   YOB: 1985 Age: 34 year old      Date of Admission:  2019    Primary care provider: [facility]          Assessment and Plan:   35yo  with previous fulguration of fallopian tubes here for and abdominal pain and positive HCG, with assumed ectopic pregnancy.   --Discussed options including methotrexate vs. Bilateral salpingectomy, and she would like to proceed with methotrexate. She is clinically stable and her US findings are benign, with a very low quantitative HCG. She is an appropriate candidate. We discussed the possible risk of intra-abdominal bleeding, pain, or failure of treatment requiring surgical intervention.   --We did discuss that it would be very hard to rule out an early intrauterine pregnancy without serial pregnancy levels, but her endometrium is only 6mm and she has been bleeding. She doesn't desire conservative management and doesn't have any desire for future fertility.  --She will plan follow-up in 4 and 7 days at our [place] clinic ([facility]). She was given strict criteria for returning to care.            Attestation:  This patient has been seen and evaluated by me, Puja Oliva MD. Phone  was used for the interview, and her daughter was present.              Chief Complaint:   Abdominal pain and positive pregnancy test    History is obtained from the patient         History of Present Illness:   This patient is a 34 year old female who presents with mild abdominal pain for 3-4 days. Her cycles have been irregular the past two months, but she states she thought she had a normal cycle on . Her left sided abdominal pain started afterwards, and she went to clinic where her pregnancy test was positive.     She has had four vaginal deliveries, and had a bilateral fulguration of fallopian tubes in  by Dr. Oconnor. Due to the previous  tubal surgery, she was sent immediately to the ED for further counseling and treatment.     Her pain is now 2-4/10, intermittent, not present during interview. No nausea, no vaginal bleeding now. US reveals an empty uterus with endometrium measuring .6cm. Possible hydrosalpinx on right.              Past Medical History:   No past medical history on file.          Past Surgical History:     Past Surgical History:   Procedure Laterality Date     LAPAROSCOPIC TUBAL LIGATION  2012    Procedure:LAPAROSCOPIC TUBAL LIGATION; LAPAROSCOPIC TUBAL LIGATION; Surgeon:THEODORA SUAREZ; Location: OR              Obstetrical History:      P: 4004. Four term SVDs       Social History:   I have reviewed this patient's social history          Family History:   This patient has no significant family history          Immunizations:   Immunization status is unknown         Allergies:   No Known Allergies          Medications:   (Not in a hospital admission)            Review of Systems:   The 10 point Review of Systems is negative other than noted in the HPI              Physical Exam:     Vitals were reviewed  Patient Vitals for the past 8 hrs:   BP Temp Temp src Pulse Heart Rate Resp SpO2 Height Weight   19 1755 -- -- -- -- -- -- -- 1.524 m (5') 62.1 kg (137 lb)   19 1747 -- -- -- -- -- -- 100 % -- --   19 1731 -- -- -- -- -- -- 100 % -- --   19 1659 109/75 -- -- 95 -- -- -- -- --   19 1437 -- -- -- -- -- -- 100 % -- --   19 1436 -- -- -- -- -- -- 100 % -- --   19 1435 -- -- -- 69 -- -- 100 % -- --   19 1427 -- -- -- -- -- -- 98 % -- --   19 1426 126/86 -- -- 101 -- -- -- -- --   19 1354 115/83 96.7  F (35.9  C) Temporal 85 85 16 100 % -- --     Gen--NAD  CV--RRR  Abd--Obese, soft, ND, mildly TTP in LLQ. No organomegaly  Ext--No unilateral edema       Data:     Lab Results   Component Value Date    WBC 8.7 03/15/2019    HGB 12.5 03/15/2019    HCT 38.0  03/15/2019     03/15/2019     11/16/2019    POTASSIUM 3.2 (L) 11/16/2019    CHLORIDE 109 11/16/2019    CO2 25 11/16/2019    BUN 10 11/16/2019    CR 0.61 11/16/2019     (H) 11/16/2019    TROPI <0.015 03/30/2018     Recent Results (from the past 24 hour(s))   OB  US 1st trimester w transvag    Narrative    US OB <14 WEEKS WITH TRANSVAGINAL SINGLE  11/16/2019 3:52 PM    HISTORY: History of tubal 2014, LLQ pain x 3weeks, + UPT.    TECHNIQUE: Transabdominal and transvaginal imaging were performed.  Transvaginal imaging was performed to better evaluate the uterus and  gestational sac.    COMPARISON:  None.    FINDINGS:    No evidence of an intrauterine gestational sac. Endometrial stripe is  normal measuring 0.6 cm. No endometrial fluid or complex debris.    Right ovary: Unremarkable.  Left ovary: Unremarkable.  Adnexal mass: Fluid-filled tubular structure right adnexa is most  likely hydrosalpinx. No associated color Doppler flow.  Free pelvic fluid: Trace amount of free fluid is noted near the fundus  of the uterus.      Impression    IMPRESSION: No ultrasound evidence of intrauterine or ectopic  pregnancy. Trace amount of free pelvic fluid and probable right  hydrosalpinx. Ovaries are unremarkable.    MD Puja BUCK MD

## 2019-11-17 NOTE — ED NOTES
This nurse and Julissa IRVIN administered IM methotrexate to B vastus lateralis per [facility] chemotherapy guidelines.  Gowns, gloves x 2 and mask with splash guards donned and utilized while handling methotrexate.  Excess needles and syringes placed into sharps container, scant amount of residual medication present.  PPE doffed and placed into red biohazard bag.  Ipad  utilized to explain side effects of methotrexate, administration and plan 1 hr observation period.  Pt verbalizes understanding, all questions answered.

## 2019-11-17 NOTE — DISCHARGE INSTRUCTIONS
Use Tylenol and/or Ibuprofen for pain or discomfort      Return to ER immediately if you develop: worsening pain, new vaginal bleeding, Fever > 101, persistent nausea or vomiting OR you have any other concerns about your health.

## 2019-11-18 DIAGNOSIS — O00.90 EP (ECTOPIC PREGNANCY): Primary | ICD-10-CM
